# Patient Record
Sex: MALE | Race: AMERICAN INDIAN OR ALASKA NATIVE
[De-identification: names, ages, dates, MRNs, and addresses within clinical notes are randomized per-mention and may not be internally consistent; named-entity substitution may affect disease eponyms.]

---

## 2021-11-24 ENCOUNTER — HOSPITAL ENCOUNTER (EMERGENCY)
Dept: HOSPITAL 5 - ED | Age: 26
LOS: 1 days | Discharge: HOME | End: 2021-11-25
Payer: SELF-PAY

## 2021-11-24 DIAGNOSIS — S43.005A: Primary | ICD-10-CM
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
## 2021-11-24 DIAGNOSIS — R56.9: ICD-10-CM

## 2021-11-24 PROCEDURE — 85025 COMPLETE CBC W/AUTO DIFF WBC: CPT

## 2021-11-24 PROCEDURE — 96361 HYDRATE IV INFUSION ADD-ON: CPT

## 2021-11-24 PROCEDURE — 36415 COLL VENOUS BLD VENIPUNCTURE: CPT

## 2021-11-24 PROCEDURE — 99284 EMERGENCY DEPT VISIT MOD MDM: CPT

## 2021-11-24 PROCEDURE — 80053 COMPREHEN METABOLIC PANEL: CPT

## 2021-11-24 PROCEDURE — 81001 URINALYSIS AUTO W/SCOPE: CPT

## 2021-11-24 PROCEDURE — 23650 CLTX SHO DSLC W/MNPJ WO ANES: CPT

## 2021-11-24 PROCEDURE — 96374 THER/PROPH/DIAG INJ IV PUSH: CPT

## 2021-11-24 PROCEDURE — 80048 BASIC METABOLIC PNL TOTAL CA: CPT

## 2021-11-24 PROCEDURE — 73030 X-RAY EXAM OF SHOULDER: CPT

## 2021-11-25 VITALS — SYSTOLIC BLOOD PRESSURE: 123 MMHG | DIASTOLIC BLOOD PRESSURE: 70 MMHG

## 2021-11-25 LAB
ALBUMIN SERPL-MCNC: 4.5 G/DL (ref 3.9–5)
ALT SERPL-CCNC: 19 UNITS/L (ref 7–56)
BASOPHILS # (AUTO): 0 K/MM3 (ref 0–0.1)
BASOPHILS NFR BLD AUTO: 0.2 % (ref 0–1.8)
BILIRUB UR QL STRIP: (no result)
BLOOD UR QL VISUAL: (no result)
BUN SERPL-MCNC: 11 MG/DL (ref 9–20)
BUN SERPL-MCNC: 11 MG/DL (ref 9–20)
BUN/CREAT SERPL: 11 %
BUN/CREAT SERPL: 11 %
CALCIUM SERPL-MCNC: 9.4 MG/DL (ref 8.4–10.2)
CALCIUM SERPL-MCNC: 9.7 MG/DL (ref 8.4–10.2)
EOSINOPHIL # BLD AUTO: 0 K/MM3 (ref 0–0.4)
EOSINOPHIL NFR BLD AUTO: 0 % (ref 0–4.3)
HCT VFR BLD CALC: 45.9 % (ref 35.5–45.6)
HEMOLYSIS INDEX: 7
HEMOLYSIS INDEX: 82
HGB BLD-MCNC: 14.8 GM/DL (ref 11.8–15.2)
LYMPHOCYTES # BLD AUTO: 0.7 K/MM3 (ref 1.2–5.4)
LYMPHOCYTES NFR BLD AUTO: 5.7 % (ref 13.4–35)
MCHC RBC AUTO-ENTMCNC: 32 % (ref 32–34)
MCV RBC AUTO: 95 FL (ref 84–94)
MONOCYTES # (AUTO): 0.8 K/MM3 (ref 0–0.8)
MONOCYTES % (AUTO): 6.2 % (ref 0–7.3)
MUCOUS THREADS #/AREA URNS HPF: (no result) /HPF
PH UR STRIP: 5 [PH] (ref 5–7)
PLATELET # BLD: 251 K/MM3 (ref 140–440)
PROT UR STRIP-MCNC: (no result) MG/DL
RBC # BLD AUTO: 4.84 M/MM3 (ref 3.65–5.03)
RBC #/AREA URNS HPF: < 1 /HPF (ref 0–6)
UROBILINOGEN UR-MCNC: < 2 MG/DL (ref ?–2)
WBC #/AREA URNS HPF: 1 /HPF (ref 0–6)

## 2021-11-25 NOTE — XRAY REPORT
Left shoulder, 2 views



HISTORY: Seizure, concern for dislocation



COMPARISON: None



FINDINGS: The left humeral head projects slightly posteriorly with respect to the glenoid on somewhat
 limited scapular Y view. The humerus is held in internal rotation on frontal projection. No discrete
 fracture is identified.



IMPRESSION: Left humeral head projects slightly posteriorly with respect to the glenoid on limited sc
apular Y view. Findings are concerning for posterior dislocation. Recommend correlation with axillary
 view.



Signer Name: Jeffrey Fischer MD 

Signed: 11/25/2021 2:25 AM

Workstation Name: [id number]-

## 2021-11-25 NOTE — EMERGENCY DEPARTMENT REPORT
ED Seizure HPI





- General


Chief Complaint: Seizure


Stated Complaint: SEIZURE X 5


Source: EMS


Mode of arrival: Stretcher


Limitations: Altered Mental Status





- History of Present Illness


Initial Comments: 





Patient is a 26-year-old male here with complaint of seizure.  Patient cannot t

ell me if he takes medication for seizure.  He complains of left arm pain.  

Patient is a poor historian and is unable to give much additional history.





- Related Data


                                    Allergies











Allergy/AdvReac Type Severity Reaction Status Date / Time


 


No Known Allergies Allergy   Verified 11/24/21 22:52














ED Review of Systems


ROS: 


Stated complaint: SEIZURE X 5


Other details as noted in HPI





Comment: Unobtainable due to pts medical conditions





ED Past Medical Hx





- Past Medical History


Hx Seizures: Yes





- Surgical History


Past Surgical History?: No





- Social History


Smoking Status: Unknown if ever smoked


Substance Use Type: None





ED Physical Exam





- General


Limitations: Altered Mental Status


General appearance: alert





- Head


Head exam: Present: atraumatic, normocephalic





- Eye


Eye exam: Present: normal appearance





- ENT


ENT exam: Present: other (Patient bit his tongue)





- Neck


Neck exam: Present: normal inspection





- Respiratory


Respiratory exam: Present: normal lung sounds bilaterally.  Absent: respiratory 

distress





- Cardiovascular


Cardiovascular Exam: Present: regular rate, normal rhythm.  Absent: systolic 

murmur, diastolic murmur, rubs, gallop





- GI/Abdominal


GI/Abdominal exam: Present: soft, normal bowel sounds





- Rectal


Rectal exam: Present: deferred





- Extremities Exam


Extremities exam: Present: normal inspection





- Back Exam


Back exam: Present: normal inspection





- Neurological Exam


Neurological exam: Present: alert, other (appears postictal)





- Psychiatric


Psychiatric exam: Present: normal affect, normal mood





- Skin


Skin exam: Present: warm, dry, intact





ED Course


                                   Vital Signs











  11/25/21 11/25/21 11/25/21





  00:04 00:30 01:00


 


Temperature 99.7 F H  


 


Temperature [   





Post-Procedure]   


 


Temperature [   





Pre-Procedure]   


 


Pulse Rate 95 H 94 H 


 


Pulse Rate [   





Intra-Procedure   





]   


 


Pulse Rate [   





Post-Procedure]   


 


Pulse Rate [Pre   





-Procedure]   


 


Respiratory 19 22 18





Rate   


 


Respiratory   





Rate [Intra-   





Procedure]   


 


Respiratory   





Rate [Post-   





Procedure]   


 


Respiratory   





Rate [Pre-   





Procedure]   


 


Blood Pressure  142/83 143/94


 


Blood Pressure   





[Intra-   





Procedure]   


 


Blood Pressure   





[Post-Procedure   





]   


 


Blood Pressure   





[Pre-Procedure]   


 


Blood Pressure 131/85  





[Right]   


 


O2 Sat by Pulse 98  





Oximetry   


 


O2 Sat by Pulse   





Oximetry [   





Intra-Procedure   





]   


 


O2 Sat by Pulse   





Oximetry [Post   





-Procedure]   


 


O2 Sat by Pulse   





Oximetry [Pre-   





Procedure]   














  11/25/21 11/25/21 11/25/21





  01:30 02:37 03:30


 


Temperature  99.7 F H 


 


Temperature [   





Post-Procedure]   


 


Temperature [   





Pre-Procedure]   


 


Pulse Rate  94 H 85


 


Pulse Rate [   





Intra-Procedure   





]   


 


Pulse Rate [   





Post-Procedure]   


 


Pulse Rate [Pre   





-Procedure]   


 


Respiratory 23 22 22





Rate   


 


Respiratory   





Rate [Intra-   





Procedure]   


 


Respiratory   





Rate [Post-   





Procedure]   


 


Respiratory   





Rate [Pre-   





Procedure]   


 


Blood Pressure 126/72  135/86


 


Blood Pressure   





[Intra-   





Procedure]   


 


Blood Pressure   





[Post-Procedure   





]   


 


Blood Pressure   





[Pre-Procedure]   


 


Blood Pressure  138/79 





[Right]   


 


O2 Sat by Pulse  100 





Oximetry   


 


O2 Sat by Pulse   





Oximetry [   





Intra-Procedure   





]   


 


O2 Sat by Pulse   





Oximetry [Post   





-Procedure]   


 


O2 Sat by Pulse   





Oximetry [Pre-   





Procedure]   














  11/25/21 11/25/21 11/25/21





  04:00 04:30 05:01


 


Temperature   


 


Temperature [   





Post-Procedure]   


 


Temperature [   99.6 F





Pre-Procedure]   


 


Pulse Rate 91 H 95 H 


 


Pulse Rate [   





Intra-Procedure   





]   


 


Pulse Rate [   





Post-Procedure]   


 


Pulse Rate [Pre   88





-Procedure]   


 


Respiratory 22 27 H 





Rate   


 


Respiratory   





Rate [Intra-   





Procedure]   


 


Respiratory   





Rate [Post-   





Procedure]   


 


Respiratory   12





Rate [Pre-   





Procedure]   


 


Blood Pressure 135/86 135/86 


 


Blood Pressure   





[Intra-   





Procedure]   


 


Blood Pressure   





[Post-Procedure   





]   


 


Blood Pressure   126/75





[Pre-Procedure]   


 


Blood Pressure   





[Right]   


 


O2 Sat by Pulse   





Oximetry   


 


O2 Sat by Pulse   





Oximetry [   





Intra-Procedure   





]   


 


O2 Sat by Pulse   





Oximetry [Post   





-Procedure]   


 


O2 Sat by Pulse   100





Oximetry [Pre-   





Procedure]   














  11/25/21 11/25/21 11/25/21





  05:13 05:20 05:30


 


Temperature   


 


Temperature [   





Post-Procedure]   


 


Temperature [   





Pre-Procedure]   


 


Pulse Rate   89


 


Pulse Rate [ 93 H 98 H 





Intra-Procedure   





]   


 


Pulse Rate [   





Post-Procedure]   


 


Pulse Rate [Pre   





-Procedure]   


 


Respiratory   22





Rate   


 


Respiratory 22 20 





Rate [Intra-   





Procedure]   


 


Respiratory   





Rate [Post-   





Procedure]   


 


Respiratory   





Rate [Pre-   





Procedure]   


 


Blood Pressure   130/71


 


Blood Pressure 137/87 141/83 





[Intra-   





Procedure]   


 


Blood Pressure   





[Post-Procedure   





]   


 


Blood Pressure   





[Pre-Procedure]   


 


Blood Pressure   





[Right]   


 


O2 Sat by Pulse   100





Oximetry   


 


O2 Sat by Pulse 99 100 





Oximetry [   





Intra-Procedure   





]   


 


O2 Sat by Pulse   





Oximetry [Post   





-Procedure]   


 


O2 Sat by Pulse   





Oximetry [Pre-   





Procedure]   














  11/25/21 11/25/21 11/25/21





  05:39 05:43 06:00


 


Temperature   


 


Temperature [ 99.8 F H  





Post-Procedure]   


 


Temperature [   





Pre-Procedure]   


 


Pulse Rate  92 H 


 


Pulse Rate [   





Intra-Procedure   





]   


 


Pulse Rate [ 87  





Post-Procedure]   


 


Pulse Rate [Pre   





-Procedure]   


 


Respiratory  24 





Rate   


 


Respiratory   





Rate [Intra-   





Procedure]   


 


Respiratory 18  





Rate [Post-   





Procedure]   


 


Respiratory   





Rate [Pre-   





Procedure]   


 


Blood Pressure   137/79


 


Blood Pressure   





[Intra-   





Procedure]   


 


Blood Pressure 135/82  





[Post-Procedure   





]   


 


Blood Pressure   





[Pre-Procedure]   


 


Blood Pressure   





[Right]   


 


O2 Sat by Pulse   100





Oximetry   


 


O2 Sat by Pulse   





Oximetry [   





Intra-Procedure   





]   


 


O2 Sat by Pulse 100  





Oximetry [Post   





-Procedure]   


 


O2 Sat by Pulse   





Oximetry [Pre-   





Procedure]   














  11/25/21 11/25/21 11/25/21





  06:30 06:58 08:51


 


Temperature   


 


Temperature [   





Post-Procedure]   


 


Temperature [   





Pre-Procedure]   


 


Pulse Rate  80 


 


Pulse Rate [   





Intra-Procedure   





]   


 


Pulse Rate [   





Post-Procedure]   


 


Pulse Rate [Pre   





-Procedure]   


 


Respiratory  19 





Rate   


 


Respiratory   





Rate [Intra-   





Procedure]   


 


Respiratory   





Rate [Post-   





Procedure]   


 


Respiratory   





Rate [Pre-   





Procedure]   


 


Blood Pressure 129/70  


 


Blood Pressure   





[Intra-   





Procedure]   


 


Blood Pressure   





[Post-Procedure   





]   


 


Blood Pressure   





[Pre-Procedure]   


 


Blood Pressure  129/70 





[Right]   


 


O2 Sat by Pulse 100 100 100





Oximetry   


 


O2 Sat by Pulse   





Oximetry [   





Intra-Procedure   





]   


 


O2 Sat by Pulse   





Oximetry [Post   





-Procedure]   


 


O2 Sat by Pulse   





Oximetry [Pre-   





Procedure]   














  11/25/21





  09:01


 


Temperature 


 


Temperature [ 





Post-Procedure] 


 


Temperature [ 





Pre-Procedure] 


 


Pulse Rate 81


 


Pulse Rate [ 





Intra-Procedure 





] 


 


Pulse Rate [ 





Post-Procedure] 


 


Pulse Rate [Pre 





-Procedure] 


 


Respiratory 16





Rate 


 


Respiratory 





Rate [Intra- 





Procedure] 


 


Respiratory 





Rate [Post- 





Procedure] 


 


Respiratory 





Rate [Pre- 





Procedure] 


 


Blood Pressure 


 


Blood Pressure 





[Intra- 





Procedure] 


 


Blood Pressure 





[Post-Procedure 





] 


 


Blood Pressure 





[Pre-Procedure] 


 


Blood Pressure 123/70





[Right] 


 


O2 Sat by Pulse 100





Oximetry 


 


O2 Sat by Pulse 





Oximetry [ 





Intra-Procedure 





] 


 


O2 Sat by Pulse 





Oximetry [Post 





-Procedure] 


 


O2 Sat by Pulse 





Oximetry [Pre- 





Procedure] 














- Reevaluation(s)


Reevaluation #2: 





11/25/21 06:15


X-ray shows likely successful reduction.  Labs notable for mildly elevated anion

gap, CO2 of 12.  Patient is receiving fluids and has a repeat BMP ordered.





- Moderate Sedation


Indications: fracture/dislocation redu


ASA Class: I


Mallampati Airway Score: 1


Time of Last PO Intake: 00:00


Preparation: cardiac monitor applied, pulse oximeter, capnometry used, 

supplemental O2 applied, suction/airway equipment at bedside


IV Propofol Dose (mgs): 160


Complications: none


Patient Tolerated Procedure: well





- Orthopedic Joint Reduction


  ** Joint #1


Consent Obtained: written consent


Time Out Performed: Yes


Side: left


Joint Reduction Location: shoulder


Analgesia: moderate sedation


Shoulder Technique Used (if applicable): traction/counter-traction, external 

rotation


Technique Used: traction/counter-traction


Post-Reduction Neuro Exam: intact


Post-Reduction Vascular Exam: intact


Post Reduction X-Ray Obtained: Yes


Post Reduction X-Ray Results: reduced


Splint Applied: Yes


Patient Tolerated Procedure: well





ED Medical Decision Making





- Lab Data


Result diagrams: 


                                 11/25/21 00:48





                                 11/25/21 07:03





- Radiology Data


Radiology results: report reviewed





- Medical Decision Making





26-year-old male here after seizure.  Patient unable to tell if he is on seizure

medication.  Patient is being started on Keppra 1 g, will obtain CT head, basic 

labs and x-ray of the left shoulder.  Will reassess.


Critical care attestation.: 


If time is entered above; I have spent that time in minutes in the direct care 

of this critically ill patient, excluding procedure time.








ED Disposition


Clinical Impression: 


 Seizure, Shoulder dislocation





Disposition: 01 HOME / SELF CARE / HOMELESS


Is pt being admited?: No


Does the pt Need Aspirin: No


Condition: Stable


Instructions:  Shoulder Dislocation, Seizure, Adult, Easy-to-Read


Additional Instructions: 


Please follow-up with a primary care physician.





You have been given a referral for a local orthopedist, Dr. Vidales, to follow-up

regarding your shoulder dislocation.





You have been given a referral for a local neurologist, Dr. Catalan, to follow-up 

regarding your seizures.  Because of your seizures, you cannot drive or operate 

any heavy machinery for at least 6 months or until cleared by a neurologist.  

Please avoid any illicit drug use, alcohol use, excessive caffeine use, and try 

to get 8 hours of uninterrupted sleep at night.





Return to the emergency department with any worsening of your symptoms, new or 

concerning symptoms not addressed during this current emergency department 

visit, or with any acute distress.


Referrals: 


HAILEY CATALAN MD [Referring] - 3-5 Days


SUSHMA VIDALES MD [Staff Physician] - 7-10 days

## 2021-11-25 NOTE — XRAY REPORT
Left shoulder, 2 views



HISTORY: Postreduction



COMPARISON: Same-day radiograph



FINDINGS: The arm is held in internal rotation on frontal view. Humeral head projects over the scapul
a on scapular Y view. There is flattening of the anteromedial humeral head, likely reflecting reverse
 Hill-Sachs deformity. No additional fracture.



IMPRESSION:

1. Apparent successful reduction of the posterior left shoulder dislocation. Recommend confirmation w
ith axillary view.

2. Reverse Hill-Sachs deformity.



Signer Name: Jeffrey iFscher MD 

Signed: 11/25/2021 5:54 AM

Workstation Name: [email or web address]-